# Patient Record
Sex: MALE | Race: WHITE | NOT HISPANIC OR LATINO
[De-identification: names, ages, dates, MRNs, and addresses within clinical notes are randomized per-mention and may not be internally consistent; named-entity substitution may affect disease eponyms.]

---

## 2024-03-28 ENCOUNTER — APPOINTMENT (OUTPATIENT)
Dept: PAIN MANAGEMENT | Facility: CLINIC | Age: 39
End: 2024-03-28
Payer: COMMERCIAL

## 2024-03-28 DIAGNOSIS — G20.A1 PARKINSON'S DISEASE WITHOUT DYSKINESIA, WITHOUT MENTION OF FLUCTUATIONS: ICD-10-CM

## 2024-03-28 PROBLEM — Z00.00 ENCOUNTER FOR PREVENTIVE HEALTH EXAMINATION: Status: ACTIVE | Noted: 2024-03-28

## 2024-03-28 PROCEDURE — 99204 OFFICE O/P NEW MOD 45 MIN: CPT

## 2024-03-28 RX ORDER — CARBIDOPA AND LEVODOPA 25; 100 MG/1; MG/1
25-100 TABLET, ORALLY DISINTEGRATING ORAL
Refills: 0 | Status: ACTIVE | COMMUNITY

## 2024-03-28 RX ORDER — GABAPENTIN 300 MG/1
300 CAPSULE ORAL
Refills: 0 | Status: ACTIVE | COMMUNITY

## 2024-03-28 RX ORDER — METHYLPREDNISOLONE 4 MG/1
4 TABLET ORAL
Qty: 1 | Refills: 0 | Status: ACTIVE | COMMUNITY
Start: 2024-03-28 | End: 1900-01-01

## 2024-03-30 NOTE — PHYSICAL EXAM
[de-identified] : BACK- Seated slump positive on the left. Pain with lateral rotation to the right. Pain with extension of the neck.

## 2024-03-30 NOTE — DISCUSSION/SUMMARY
[de-identified] : A discussion regarding available pain management treatment options occurred with the patient. These included interventional, rehabilitative, pharmacological, and alternative modalities. We will proceed with the following:   Interventional treatment options: 1. MRI lumbar spine w/o contrast was ordered to evaluate for anatomic changes of the lumbar discs, nerves, and surrounding tissue that will help provide information to accurately diagnose the patient's cause of pain and therefore treat said pain generator in the most effective way possible - whether that be specific physical therapy recommendations, medications, and/or interventional therapies.  2. MRI cervical spine w/o contrast was ordered to evaluate for anatomic changes of the lumbar discs, nerves, and surrounding tissue that will help provide information to accurately diagnose the patient's cause of pain and therefore treat said pain generator in the most effective way possible - whether that be specific physical therapy recommendations, medications, and/or interventional therapies.   Rehabilitative options: -Participation in active HEP was discussed and printed. -NECK: I gave the patient a list of home exercises to do for pain reduction and overall improvement in functional status including but not limited to active neck rotation, active neck side bend, neck flexion, neck extension, chin tuck, scalene stretch, isometric neck flexion, isometric neck extension, isometric neck side bend, head lift/neck curl, head lift/neck side bend, neck extension on hands and knees, and scapula squeeze. -LUMBAR DISC: Patient given specific exercises to do including side plank, Quadruped arm/leg raise, Extension exercise, and Glut Bridges.    Medication based treatment options: - ordered a Medrol Dose Pack 4mg, use as directed for a duration of 6 days.  -ordered Meloxicam 15mg daily for 4 weeks. Discussed risks and benefits. Avoid taking for any side effects. -Patient is aware to take for 2 weeks straight than take 1 week off before repeating. - ordered Gabapentin 300mg BID for a duration of 4 weeks.    Complementary treatment options: - Patient was advised to stay away from any heavy lifting. If needed, he was advised to squat and not bend forward. - Initiate physician directed activity and lifestyle modifications. - Advised not to sleep in fetal position. She was advised to sleep with her neck in a straight position with one pillow.   Follow up in 4-6 weeks for reassessment.   I, Abrahan Keenan, attest that this documentation has been prepared under the direction and in the presence of Provider Jeffry Grover, DO  The documentation recorded by the scribe, in my presence, accurately reflects the service I personally performed, and the decisions made by me with my edits as appropriate.   Best Regards, Jeffry Grover D.O.

## 2024-03-30 NOTE — HISTORY OF PRESENT ILLNESS
[FreeTextEntry1] : HISTORY OF PRESENT ILLNESS: Mr. Joseph is a 39-year-old male with a history of a BL C3-6 laminoplasty back in 2022 complaining of lower back and neck pain. The pain started after no specific injury or event and has been worsening since the surgery.  The patient has had this pain for years.  Patient describes the pain as moderate to severe.  During the last month the pain has been nearly constant with symptoms worsening in no typical pattern. He notes he was diagnosed with an early onset of Parkinsons. We reviewed the MRIs of his lumbar and cervical spine from 2022 in detail during today's visit.   Pain described as throbbing.  Pain is associated with numbness/pins and needles into the BL lower extremities. Pain is increased with standing and walking. Pain is not changed with coughing sneezing or bowel movements.  Bowel or bladder habits have not changed.   He presents with low back pain that is throbbing that does radiate down the posterior lateral thighs into the groin region and leg that is 8/10 pain intensity. NSAIDs has been tried without relief. Patient denies bowel/bladder incontinence, weakness, falls.  He presents with neck pain that is sharp, burning, tingling that radiates down the arm and is 9/10 pain intensity. Cervical extension and lateral rotation worsen the pain. NSAIDs, PT, and injection therapy has been tried without relief. Patient denies bowel/bladder incontinence, weakness, falls, tingling, numbness.  ACTIVITIES: Patient uses no assisted walking device at this time.  Patient has difficulty performing household chores, going to work, doing yardwork or shopping, participating in recreational activities & exercise at this time.   PRIOR PAIN TREATMENTS: No relief with surgery, epidural injection, physical therapy, exercise, TENS, heat treatment, cold treatment.   Prior Pain Medications: Gabapentin, Meloxicam.

## 2024-04-25 ENCOUNTER — APPOINTMENT (OUTPATIENT)
Dept: PAIN MANAGEMENT | Facility: CLINIC | Age: 39
End: 2024-04-25
Payer: COMMERCIAL

## 2024-04-25 DIAGNOSIS — M50.20 OTHER CERVICAL DISC DISPLACEMENT, UNSPECIFIED CERVICAL REGION: ICD-10-CM

## 2024-04-25 DIAGNOSIS — M54.16 RADICULOPATHY, LUMBAR REGION: ICD-10-CM

## 2024-04-25 PROCEDURE — 99214 OFFICE O/P EST MOD 30 MIN: CPT

## 2024-04-25 NOTE — PHYSICAL EXAM
[de-identified] : BACK- Seated slump positive on the left. Pain with lateral rotation to the right. Pain with extension of the neck.

## 2024-04-25 NOTE — DISCUSSION/SUMMARY
[de-identified] : A discussion regarding available pain management treatment options occurred with the patient. These included interventional, rehabilitative, pharmacological, and alternative modalities. We will proceed with the following:   Interventional treatment options: 1. MRI lumbar spine w/o contrast was ordered to evaluate for anatomic changes of the lumbar discs, nerves, and surrounding tissue that will help provide information to accurately diagnose the patient's cause of pain and therefore treat said pain generator in the most effective way possible - whether that be specific physical therapy recommendations, medications, and/or interventional therapies.  Rehabilitative options: - c/w participation in active HEP as discussed.   Medication based treatment options:  -renewed Meloxicam 15mg daily for 4 weeks. Discussed risks and benefits. Avoid taking for any side effects. - Patient is aware to take for 2 weeks straight than take 1 week off before repeating. - renewed Gabapentin 300mg BID for a duration of 4 weeks.    Complementary treatment options: - Patient was advised to stay away from any heavy lifting. If needed, he was advised to squat and not bend forward. - physician directed activity and lifestyle modifications. - Advised not to sleep in fetal position. She was advised to sleep with her neck in a straight position with one pillow.   Follow up in 6 weeks for reassessment.   I, Abrahan Keenan, attest that this documentation has been prepared under the direction and in the presence of Provider Jeffry Grover, DO The documentation recorded by the scribe, in my presence, accurately reflects the service I personally performed, and the decisions made by me with my edits as appropriate.   Best Regards, Jeffry Grover D.O.

## 2024-04-25 NOTE — HISTORY OF PRESENT ILLNESS
[FreeTextEntry1] : HISTORY OF PRESENT ILLNESS: Mr. Joseph is a 39-year-old male with a history of a BL C3-6 laminoplasty back in 2022 complaining of lower back and neck pain. The pain started after no specific injury or event and has been worsening since the surgery.  The patient has had this pain for years.  Patient describes the pain as moderate to severe.  During the last month the pain has been nearly constant with symptoms worsening in no typical pattern. He notes he was diagnosed with an early onset of Parkinsons. We reviewed the MRIs of his lumbar and cervical spine from 2022 in detail during today's visit.   Pain described as throbbing.  Pain is associated with numbness/pins and needles into the BL lower extremities. Pain is increased with standing and walking. Pain is not changed with coughing sneezing or bowel movements.  Bowel or bladder habits have not changed.   He presents with low back pain that is throbbing that does radiate down the posterior lateral thighs into the groin region and leg that is 8/10 pain intensity. NSAIDs has been tried without relief. Patient denies bowel/bladder incontinence, weakness, falls.  He presents with neck pain that is sharp, burning, tingling that radiates down the arm and is 9/10 pain intensity. Cervical extension and lateral rotation worsen the pain. NSAIDs, PT, and injection therapy has been tried without relief. Patient denies bowel/bladder incontinence, weakness, falls, tingling, numbness.  PRESENTING TODAY 04-: Patient presents to the office today for a follow up visit, he has recently completed a course of oral steroids which has provided him with 80% sustained relief to date. He has been managing his pain with Gabapentin and Meloxicam 15 mg daily which provides him with 30-40% improvement in pain and increase in function. He denies any side effects. He has done 4 weeks of provider guided physical therapy, 2/3x per week, with moderate relief. He states still 7/10 in his low back at times.

## 2024-06-24 ENCOUNTER — RX RENEWAL (OUTPATIENT)
Age: 39
End: 2024-06-24

## 2024-06-24 RX ORDER — GABAPENTIN 300 MG/1
300 CAPSULE ORAL 3 TIMES DAILY
Qty: 90 | Refills: 0 | Status: ACTIVE | COMMUNITY
Start: 2024-03-28 | End: 1900-01-01

## 2024-07-02 ENCOUNTER — RX RENEWAL (OUTPATIENT)
Age: 39
End: 2024-07-02

## 2024-07-23 ENCOUNTER — APPOINTMENT (OUTPATIENT)
Dept: PAIN MANAGEMENT | Facility: CLINIC | Age: 39
End: 2024-07-23

## 2024-07-25 ENCOUNTER — RX RENEWAL (OUTPATIENT)
Age: 39
End: 2024-07-25

## 2024-08-20 ENCOUNTER — APPOINTMENT (OUTPATIENT)
Dept: PAIN MANAGEMENT | Facility: CLINIC | Age: 39
End: 2024-08-20
Payer: COMMERCIAL

## 2024-08-20 DIAGNOSIS — M54.16 RADICULOPATHY, LUMBAR REGION: ICD-10-CM

## 2024-08-20 DIAGNOSIS — M25.552 PAIN IN LEFT HIP: ICD-10-CM

## 2024-08-20 DIAGNOSIS — M25.559 PAIN IN UNSPECIFIED HIP: ICD-10-CM

## 2024-08-20 PROCEDURE — 99214 OFFICE O/P EST MOD 30 MIN: CPT

## 2024-08-20 RX ORDER — GABAPENTIN 400 MG/1
400 CAPSULE ORAL TWICE DAILY
Qty: 60 | Refills: 0 | Status: ACTIVE | COMMUNITY
Start: 2024-08-20 | End: 1900-01-01

## 2024-08-20 NOTE — DISCUSSION/SUMMARY
[de-identified] : A discussion regarding available pain management treatment options occurred with the patient. These included interventional, rehabilitative, pharmacological, and alternative modalities. We will proceed with the following:   Interventional treatment options: 1. MRI lumbar spine w/o contrast was ordered to evaluate for anatomic changes of the lumbar discs, nerves, and surrounding tissue that will help provide information to accurately diagnose the patient's cause of pain and therefore treat said pain generator in the most effective way possible - whether that be specific physical therapy recommendations, medications, and/or interventional therapies. 2. Ordered X-ray of the LT hips/ pelvis due to pain and decrease in range of motion in that area to delineate a pain generator  Rehabilitative options: - c/w participation in active HEP as discussed.   Medication based treatment options:  -renewed Meloxicam 15mg daily for 4 weeks. Discussed risks and benefits. Avoid taking for any side effects. - Patient is aware to take for 2 weeks straight than take 1 week off before repeating. - renewed Gabapentin 400mg BID for a duration of 4 weeks.    Complementary treatment options: - Patient was advised to stay away from any heavy lifting. If needed, he was advised to squat and not bend forward. - physician directed activity and lifestyle modifications. - Advised not to sleep in fetal position. She was advised to sleep with her neck in a straight position with one pillow.   Follow up in 2-3 weeks for reassessment.     POP Tesfaye D.O.

## 2024-08-20 NOTE — HISTORY OF PRESENT ILLNESS
[FreeTextEntry1] : HISTORY OF PRESENT ILLNESS: Mr. Joseph is a 39-year-old male with a history of a BL C3-6 laminoplasty back in 2022 complaining of lower back and neck pain. The pain started after no specific injury or event and has been worsening since the surgery.  The patient has had this pain for years.  Patient describes the pain as moderate to severe.  During the last month the pain has been nearly constant with symptoms worsening in no typical pattern. He notes he was diagnosed with an early onset of Parkinsons. We reviewed the MRIs of his lumbar and cervical spine from 2022 in detail during today's visit.   Pain described as throbbing.  Pain is associated with numbness/pins and needles into the BL lower extremities. Pain is increased with standing and walking. Pain is not changed with coughing sneezing or bowel movements.  Bowel or bladder habits have not changed.   He presents with low back pain that is throbbing that does radiate down the posterior lateral thighs into the groin region and leg that is 8/10 pain intensity. NSAIDs has been tried without relief. Patient denies bowel/bladder incontinence, weakness, falls.  He presents with neck pain that is sharp, burning, tingling that radiates down the arm and is 9/10 pain intensity. Cervical extension and lateral rotation worsen the pain. NSAIDs, PT, and injection therapy has been tried without relief. Patient denies bowel/bladder incontinence, weakness, falls, tingling, numbness.  PRESENTING TODAY 08/20/24 Patient presents to the office today for a follow up visit, he has recently completed a course of oral steroids which has provided him with 80% sustained relief at that time, however slowly his pain has returned to baseline. He has been managing his pain with Gabapentin and Meloxicam 15 mg daily which provides him with 30-40% improvement in pain and increase in function. He denies any side effects. he has also been doing physician directed exercises for the past 8 weeks at home (3-4 times a week). Pt continues to have back pain with radiculopathy down his LE LT>RT, pain is 8/10 is is limiting his ADLs. We had requested for a lumbar MRI, it got denied we will resubmit for his MRI  Pt also s/o LT hip pain worse in the morning gets better as he walks around. pain is worse when he gets up from a sitting position.

## 2024-08-26 ENCOUNTER — RX RENEWAL (OUTPATIENT)
Age: 39
End: 2024-08-26

## 2024-09-16 ENCOUNTER — RX RENEWAL (OUTPATIENT)
Age: 39
End: 2024-09-16

## 2024-09-23 ENCOUNTER — RX RENEWAL (OUTPATIENT)
Age: 39
End: 2024-09-23

## 2024-09-23 ENCOUNTER — APPOINTMENT (OUTPATIENT)
Dept: PAIN MANAGEMENT | Facility: CLINIC | Age: 39
End: 2024-09-23
Payer: COMMERCIAL

## 2024-09-23 DIAGNOSIS — M54.12 RADICULOPATHY, CERVICAL REGION: ICD-10-CM

## 2024-09-23 DIAGNOSIS — M54.16 RADICULOPATHY, LUMBAR REGION: ICD-10-CM

## 2024-09-23 DIAGNOSIS — M50.20 OTHER CERVICAL DISC DISPLACEMENT, UNSPECIFIED CERVICAL REGION: ICD-10-CM

## 2024-09-23 PROCEDURE — 99214 OFFICE O/P EST MOD 30 MIN: CPT

## 2024-09-23 NOTE — DISCUSSION/SUMMARY
[de-identified] : A discussion regarding available pain management treatment options occurred with the patient. These included interventional, rehabilitative, pharmacological, and alternative modalities. We will proceed with the following:   Interventional treatment options: 1. Proceed with ANANDA with MAC  - Patient had an MRI that showed a radicular component along with pain referred to the upper extremity. I will schedule a cervical epidural steroid  1-3 depending on effectiveness. 2. Proceed with L5-S1 LESI with MAC (if patient does not sustained relief from the injection he will follow-up with neurosurgery for epidural fat removal) - Treatment options were discussed with the patient. The patient has been having persistent lower back and lumbar radicular pain with minimal improvement with conservative therapies. Given that the patient has severe pain and failed conservative treatment, the patient was given the option to proceed with a lumbar epidural steroid injection to try to get some pain relief. - The risks and benefits were discussed which included bleeding, infection, nerve injury, no pain relief or worse, increased pain. All questions were answered and concerns addressed. The patient has severe anxiety of procedures that necessitates monitored anesthesia care (MAC). The procedure performed will be close to major nerves, arteries, and spinal cord and/or joint structures. Due to the proximity of these structures, we need the patient to be still during the procedure. With the help of MAC, this will be safely achieved and decrease the risk of any complications.  Rehabilitative options: - c/w participation in active HEP as discussed.   Medication based treatment options: -  C/w Meloxicam 15mg daily for 4 weeks. Discussed risks and benefits. Avoid taking for any side effects. - Patient is aware to take for 2 weeks straight than take 1 week off before repeating. - C/w Gabapentin 400mg TID for a duration of 4 weeks.    Complementary treatment options: - Patient was advised to stay away from any heavy lifting. If needed, he was advised to squat and not bend forward. - physician directed activity and lifestyle modifications. - Advised not to sleep in fetal position. She was advised to sleep with her neck in a straight position with one pillow.   Follow up in 2 weeks for reassessment after injection    POP Tesfaye D.O.

## 2024-09-23 NOTE — HISTORY OF PRESENT ILLNESS
[FreeTextEntry1] : HISTORY OF PRESENT ILLNESS: Mr. Joseph is a 39-year-old male with a history of a BL C3-6 laminoplasty back in 2022 complaining of lower back and neck pain. The pain started after no specific injury or event and has been worsening since the surgery.  The patient has had this pain for years.  Patient describes the pain as moderate to severe.  During the last month the pain has been nearly constant with symptoms worsening in no typical pattern. He notes he was diagnosed with an early onset of Parkinsons. We reviewed the MRIs of his lumbar and cervical spine from 2022 in detail during today's visit.   Pain described as throbbing.  Pain is associated with numbness/pins and needles into the BL lower extremities. Pain is increased with standing and walking. Pain is not changed with coughing sneezing or bowel movements.  Bowel or bladder habits have not changed.   He presents with low back pain that is throbbing that does radiate down the posterior lateral thighs into the groin region and leg that is 8/10 pain intensity. NSAIDs has been tried without relief. Patient denies bowel/bladder incontinence, weakness, falls.  He presents with neck pain that is sharp, burning, tingling that radiates down the arm and is 9/10 pain intensity. Cervical extension and lateral rotation worsen the pain. NSAIDs, PT, and injection therapy has been tried without relief. Patient denies bowel/bladder incontinence, weakness, falls, tingling, numbness.  PRESENTING TODAY 9/2/2024:  Patient is here today to follow-up after lumbar MRI which shows multilevel epidural fat, He continues to have pain at the lower back with radicular feature that goes down both his lower extremities all the way down to his foot, pain associated with sharp shooting pain, numbness, tingling which is causing him pain on a daily basis, pain at his 8 out of 10 on a pain scale. He has been managing his pain with Gabapentin and Meloxicam 15 mg daily which provides him with 30-40% improvement in pain and increase in function.  He states that he is experiencing some drowsiness from the gabapentin therefore were not can increase his dose.  We discussed undergoing an L5-S1 LESI and he agrees to go to do so, if pain continues patient will follow-up with neurosurgery for epidural fat removal. As for his neck, patient continues to have neck pain with flexion extension, lateral right and left rotation with pain that radiates down to his bilateral upper extremities with associated with numbness and tingling.  Patient has an MRI which shows C7-T1 moderate left foraminal stenosis and mild right foraminal stenosis, we discussed undergoing ANANDA and he agrees to do so.

## 2024-09-23 NOTE — PHYSICAL EXAM
[de-identified] : BACK- Seated slump positive on the left.  + Angus test (LT hiP)  Cervical Spine Exam:   Inspection: Erythema: (-) Ecchymosis: (-) Rashes: (-)   Palpation: Trapezius spasms (+) Trapezius tenderness (+) Paracervical spasms (+) Paracervical tenderness (+) Rhomboid spasms (-) Rhomboid tenderness (-)   Cervical  ROM Limited ROM and pain with Extension Limited ROM and pain with LT and RT rotation   Strength: Deltoid: R (5/5) L (5/5) Biceps: R (5/5) L (5/5) Triceps: R (5/5) L (5/5) Wrist flexors: R (5/5) L (5/5) Finger flexors: R (5/5) L (5/5) Grasp: R (5/5) L (5/5)   Special Tests: Spurling: R (+) ; L (+) Facet loading (Mike's test): R (-) ; L (-) Zuleta reflex: R (-) ; L (-)   Neurologic: Light touch intact throughout LE Reflexes normal and symmetric     Lumbar Spine Exam:   Inspection: Erythema: (-) Ecchymosis: (-) Rashes: (-)   Alignment: No spine misalignment, scoliosis, or Kyphosis Elevated right rib hump with forward bending: (-) Elevated left rib hump with forward bending: (-) RT/LT scapula winging (-)   Palpation: Midline lumbar tenderness: (+)    Lumbar ROM  Limited ROM and pain with Extension   Strength: Hip Flexion: R (5/5) L (5/5) Knee extension: R (5/5) L (5/5) Knee flexion: R (5/5) L (5/5) Ankle Dorsiflexion: R (5/5) L(5/5) EHL: R (5/5) L (5/5) Ankle Plantarflexion: R (5/5) L (5/5)   Special Tests: - Positive SLR test- + b/l - Positive Slump test-  + b/l - Negative Facet loading/Extension Rotation Test B/l   Neurologic: Light touch intact throughout LE Reflexes normal and symmetric   Gait: Normal gait, stable, walks without assistance. Normal toe and heel walking. No signs of foot drop, Drag and slap test (-)

## 2024-10-03 ENCOUNTER — APPOINTMENT (OUTPATIENT)
Facility: CLINIC | Age: 39
End: 2024-10-03

## 2024-10-09 ENCOUNTER — APPOINTMENT (OUTPATIENT)
Facility: CLINIC | Age: 39
End: 2024-10-09

## 2024-10-09 ENCOUNTER — APPOINTMENT (OUTPATIENT)
Dept: PAIN MANAGEMENT | Facility: CLINIC | Age: 39
End: 2024-10-09
Payer: COMMERCIAL

## 2024-10-09 DIAGNOSIS — M54.16 RADICULOPATHY, LUMBAR REGION: ICD-10-CM

## 2024-10-09 PROCEDURE — 62323 NJX INTERLAMINAR LMBR/SAC: CPT

## 2024-10-17 ENCOUNTER — APPOINTMENT (OUTPATIENT)
Dept: PAIN MANAGEMENT | Facility: CLINIC | Age: 39
End: 2024-10-17
Payer: COMMERCIAL

## 2024-10-17 ENCOUNTER — APPOINTMENT (OUTPATIENT)
Facility: CLINIC | Age: 39
End: 2024-10-17

## 2024-10-17 DIAGNOSIS — M54.12 RADICULOPATHY, CERVICAL REGION: ICD-10-CM

## 2024-10-17 PROCEDURE — 62321 NJX INTERLAMINAR CRV/THRC: CPT

## 2024-10-28 ENCOUNTER — APPOINTMENT (OUTPATIENT)
Dept: PAIN MANAGEMENT | Facility: CLINIC | Age: 39
End: 2024-10-28
Payer: COMMERCIAL

## 2024-10-28 VITALS — WEIGHT: 296 LBS | HEIGHT: 70 IN | BODY MASS INDEX: 42.37 KG/M2

## 2024-10-28 DIAGNOSIS — M54.16 RADICULOPATHY, LUMBAR REGION: ICD-10-CM

## 2024-10-28 DIAGNOSIS — M54.12 RADICULOPATHY, CERVICAL REGION: ICD-10-CM

## 2024-10-28 PROCEDURE — 99214 OFFICE O/P EST MOD 30 MIN: CPT

## 2025-04-28 ENCOUNTER — APPOINTMENT (OUTPATIENT)
Dept: PAIN MANAGEMENT | Facility: CLINIC | Age: 40
End: 2025-04-28

## 2025-05-01 ENCOUNTER — APPOINTMENT (OUTPATIENT)
Dept: PAIN MANAGEMENT | Facility: CLINIC | Age: 40
End: 2025-05-01
Payer: COMMERCIAL

## 2025-05-01 DIAGNOSIS — M54.12 RADICULOPATHY, CERVICAL REGION: ICD-10-CM

## 2025-05-01 DIAGNOSIS — M54.16 RADICULOPATHY, LUMBAR REGION: ICD-10-CM

## 2025-05-01 PROCEDURE — 99213 OFFICE O/P EST LOW 20 MIN: CPT

## 2025-09-11 ENCOUNTER — APPOINTMENT (OUTPATIENT)
Dept: PAIN MANAGEMENT | Facility: CLINIC | Age: 40
End: 2025-09-11